# Patient Record
Sex: FEMALE | URBAN - METROPOLITAN AREA
[De-identification: names, ages, dates, MRNs, and addresses within clinical notes are randomized per-mention and may not be internally consistent; named-entity substitution may affect disease eponyms.]

---

## 2024-01-23 ENCOUNTER — APPOINTMENT (RX ONLY)
Dept: URBAN - METROPOLITAN AREA CLINIC 308 | Facility: CLINIC | Age: 34
Setting detail: DERMATOLOGY
End: 2024-01-23

## 2024-01-23 DIAGNOSIS — L72.0 EPIDERMAL CYST: ICD-10-CM

## 2024-01-23 DIAGNOSIS — L24 IRRITANT CONTACT DERMATITIS: ICD-10-CM | Status: INADEQUATELY CONTROLLED

## 2024-01-23 PROBLEM — L24.9 IRRITANT CONTACT DERMATITIS, UNSPECIFIED CAUSE: Status: ACTIVE | Noted: 2024-01-23

## 2024-01-23 PROCEDURE — 81025 URINE PREGNANCY TEST: CPT

## 2024-01-23 PROCEDURE — ? PRESCRIPTION

## 2024-01-23 PROCEDURE — ? INTRALESIONAL KENALOG

## 2024-01-23 PROCEDURE — 99204 OFFICE O/P NEW MOD 45 MIN: CPT | Mod: 25

## 2024-01-23 PROCEDURE — ? COUNSELING

## 2024-01-23 PROCEDURE — ? ADDITIONAL NOTES

## 2024-01-23 PROCEDURE — ? URINE PREGNANCY TEST

## 2024-01-23 PROCEDURE — 11900 INJECT SKIN LESIONS </W 7: CPT

## 2024-01-23 RX ORDER — HYDROCORTISONE 25 MG/G
CREAM TOPICAL
Qty: 28.35 | Refills: 3 | Status: ERX | COMMUNITY
Start: 2024-01-23

## 2024-01-23 RX ORDER — DOXYCYCLINE HYCLATE 100 MG/1
TABLET, COATED ORAL
Qty: 28 | Refills: 0 | Status: ERX | COMMUNITY
Start: 2024-01-23

## 2024-01-23 RX ADMIN — HYDROCORTISONE: 25 CREAM TOPICAL at 00:00

## 2024-01-23 RX ADMIN — DOXYCYCLINE HYCLATE: 100 TABLET, COATED ORAL at 00:00

## 2024-01-23 ASSESSMENT — LOCATION DETAILED DESCRIPTION DERM
LOCATION DETAILED: LEFT ANTERIOR SHOULDER
LOCATION DETAILED: LEFT ANTERIOR AXILLA

## 2024-01-23 ASSESSMENT — LOCATION SIMPLE DESCRIPTION DERM
LOCATION SIMPLE: LEFT SHOULDER
LOCATION SIMPLE: LEFT ANTERIOR AXILLA

## 2024-01-23 ASSESSMENT — LOCATION ZONE DERM
LOCATION ZONE: ARM
LOCATION ZONE: AXILLAE

## 2024-01-23 NOTE — PROCEDURE: INTRALESIONAL KENALOG
How Many Mls Were Removed From The 40 Mg/Ml (5ml) Vial When Preparing The Injectable Solution?: 0
Require Ndc Code?: No
Ndc# For Kenalog Only: 86818-737-01
Lot # For Kenalog (Optional): [id number]
Kenalog Type Of Vial: Single Dose
Validate Note Data When Using Inventory: Yes
Expiration Date For Kenalog (Optional): 12/2024
Detail Level: Detailed
Concentration Of Kenalog Solution Injected (Mg/Ml): 20.0
Consent: The risks of atrophy were reviewed with the patient.  The patient's verbal consent was obtained.
Kenalog Preparation: Kenalog
Total Volume (Ccs): 0.2
Medical Necessity Clause: This procedure was medically necessary because the lesions that were treated were:

## 2024-01-23 NOTE — PROCEDURE: URINE PREGNANCY TEST
Lot # (Optional): [id number]
Urine Pregnancy Test Result: negative
Expiration Date (Optional): 08/25/2024
Performed By: Wilda Sauceda MA
Detail Level: None

## 2024-01-23 NOTE — PROCEDURE: ADDITIONAL NOTES
Detail Level: Simple
Additional Notes: Discussed possible ddx to include HS vs EIC. Patient denies previous episodes making suspicion for EIC higher.  Recommended ILK and doxycycline.  Pregnancy test negative per above.  If recurs, patient was provided our voicemail for excision - recommended this be done with plastics.
Render Risk Assessment In Note?: yes

## 2024-11-13 ENCOUNTER — OFFICE VISIT (OUTPATIENT)
Dept: GASTROENTEROLOGY | Facility: CLINIC | Age: 34
End: 2024-11-13
Payer: COMMERCIAL

## 2024-11-13 VITALS
SYSTOLIC BLOOD PRESSURE: 132 MMHG | TEMPERATURE: 97.4 F | OXYGEN SATURATION: 99 % | DIASTOLIC BLOOD PRESSURE: 91 MMHG | WEIGHT: 248.6 LBS | HEART RATE: 81 BPM | BODY MASS INDEX: 37.68 KG/M2 | HEIGHT: 68 IN

## 2024-11-13 DIAGNOSIS — K75.81 METABOLIC DYSFUNCTION-ASSOCIATED STEATOHEPATITIS (MASH): Primary | ICD-10-CM

## 2024-11-13 DIAGNOSIS — R16.0 HEPATOMEGALY: ICD-10-CM

## 2024-11-13 DIAGNOSIS — R79.89 ELEVATED LFTS: ICD-10-CM

## 2024-11-13 PROCEDURE — 99204 OFFICE O/P NEW MOD 45 MIN: CPT | Performed by: INTERNAL MEDICINE

## 2024-11-13 RX ORDER — LEVOTHYROXINE SODIUM 25 UG/1
25 TABLET ORAL DAILY
COMMUNITY

## 2024-11-13 NOTE — PROGRESS NOTES
[facility] - Outpatient Consultation  Sana Bolanos 34 y.o. female MRN: [id number]  Encounter: [id number]          ASSESSMENT AND PLAN:      1. Elevated LFTs  - Ambulatory Referral to Gastroenterology  - MARKS Fibrosure; Future  - US elastography; Future  - SOBEIDA Screen w/ Reflex to Titer/Pattern; Future  - Anti-smooth muscle antibody, IgG; Future  - IgG; Future  - Ceruloplasmin; Future we had a full  - Iron Panel (Includes Ferritin, Iron Sat%, Iron, and TIBC); Future  - Hepatitis B surface antigen; Future  - Hepatitis C antibody; Future  - Alpha-1-antitrypsin; Future  -I provided a full discussion about steatotic liver disease distinguishing metabolic dysfunction associated steatotic liver disease versus metabolic dysfunction associated steatohepatitis  -I have requested that the most recent labs from the GYN facility in [place] be sent to this clinic as soon as possible    2. Metabolic dysfunction-associated steatohepatitis (MASH) (Primary)  - MARKS Fibrosure; Future  - US elastography; Future  - SOBEIDA Screen w/ Reflex to Titer/Pattern; Future  - Anti-smooth muscle antibody, IgG; Future  - IgG; Future  - Ceruloplasmin; Future  - Iron Panel (Includes Ferritin, Iron Sat%, Iron, and TIBC); Future  - Hepatitis B surface antigen; Future  - Hepatitis C antibody; Future  - Alpha-1-antitrypsin; Future    3. Hepatomegaly  - MARKS Fibrosure; Future  - US elastography; Future  - SOBEIDA Screen w/ Reflex to Titer/Pattern; Future  - Anti-smooth muscle antibody, IgG; Future  - IgG; Future  - Ceruloplasmin; Future  - Iron Panel (Includes Ferritin, Iron Sat%, Iron, and TIBC); Future  - Hepatitis B surface antigen; Future  - Hepatitis C antibody; Future  - Alpha-1-antitrypsin; Future    ______________________________________________________________________    HPI: Sana is a 34-year-old female who recently moved from [place] to [place] and has come to this office for the establishment of care.  She was  "told within the past year that she had a fatty liver and hepatomegaly in association with abnormal liver enzymes.  I do not have any of the laboratories available to me for evaluation at this point in time.  There is no family history for liver disease.  She states that she drinks alcohol very seldom.  Her elder brother, however, was diagnosed with liver cancer in his 30s and is now .  He was not a drinker either.  She denies abdominal pain, nausea, vomiting, rectal bleeding, melena, bloating, gaseousness but she does admit to occasional heartburn and she states she has diarrhea related to a supplement she is currently taking.  She denies constipation.      Historical Information   Past Medical History:   Diagnosis Date    Fatty liver      No past surgical history on file.  Social History   Social History     Substance and Sexual Activity   Alcohol Use Yes    Comment: rarely     Social History     Substance and Sexual Activity   Drug Use Never     Social History     Tobacco Use   Smoking Status Never   Smokeless Tobacco Never     Family History   Problem Relation Age of Onset    No Known Problems Maternal Grandmother     Diabetes type I Maternal Grandfather     Hyperlipidemia Maternal Grandfather        Meds/Allergies       Current Outpatient Medications:     levothyroxine 25 mcg tablet    No Known Allergies        Objective     Blood pressure 132/91, pulse 81, temperature (!) 97.4 °F (36.3 °C), temperature source Temporal, height 5' 8\" (1.727 m), weight 113 kg (248 lb 9.6 oz), SpO2 99%. Body mass index is 37.8 kg/m².        PHYSICAL EXAM:      General Appearance:   Alert, cooperative, no distress   HEENT:   Normocephalic, atraumatic, anicteric.     Neck:  Supple, symmetrical, trachea midline   Lungs:   Clear to auscultation bilaterally; no rales, rhonchi or wheezing; respirations unlabored    Heart::   Regular rate and rhythm; no murmur, rub, or gallop.   Abdomen:   Soft, non-tender, non-distended; normal " bowel sounds; no masses, no organomegaly    Genitalia:   Deferred    Rectal:   Deferred    Extremities:  No cyanosis, clubbing or edema    Pulses:  2+ and symmetric    Skin:  No jaundice, rashes, or lesions    Lymph nodes:  No palpable cervical lymphadenopathy        Lab Results:   No visits with results within 1 Day(s) from this visit.   Latest known visit with results is:   No results found for any previous visit.         Radiology Results:   No results found.

## 2024-11-13 NOTE — LETTER
November 15, 2024     Tara Budinetz, DO [facility] - [address] [place]   [address]  [facility] [address]    Patient: Sana Bolanos   YOB: 1990   Date of Visit: 11/13/2024       Dear Dr. Budinetz:    Thank you for referring Sana Bolanos to me for evaluation. Below are my notes for this consultation.    If you have questions, please do not hesitate to call me. I look forward to following your patient along with you.         Sincerely,        Jeanmarie Stark DO        CC: No Recipients    Jeanmarie Stark DO  11/13/2024  5:13 PM  Signed  [facility] - Outpatient Consultation  Sana Bolanos 34 y.o. female MRN: [id number]  Encounter: [id number]          ASSESSMENT AND PLAN:      1. Elevated LFTs  - Ambulatory Referral to Gastroenterology  - MARKS Fibrosure; Future  - US elastography; Future  - SOBEIDA Screen w/ Reflex to Titer/Pattern; Future  - Anti-smooth muscle antibody, IgG; Future  - IgG; Future  - Ceruloplasmin; Future we had a full  - Iron Panel (Includes Ferritin, Iron Sat%, Iron, and TIBC); Future  - Hepatitis B surface antigen; Future  - Hepatitis C antibody; Future  - Alpha-1-antitrypsin; Future  -I provided a full discussion about steatotic liver disease distinguishing metabolic dysfunction associated steatotic liver disease versus metabolic dysfunction associated steatohepatitis  -I have requested that the most recent labs from the GYN facility in [place] be sent to this clinic as soon as possible    2. Metabolic dysfunction-associated steatohepatitis (MASH) (Primary)  - MARKS Fibrosure; Future  - US elastography; Future  - SOBEIDA Screen w/ Reflex to Titer/Pattern; Future  - Anti-smooth muscle antibody, IgG; Future  - IgG; Future  - Ceruloplasmin; Future  - Iron Panel (Includes Ferritin, Iron Sat%, Iron, and TIBC); Future  - Hepatitis B surface antigen; Future  - Hepatitis C antibody; Future  - Alpha-1-antitrypsin; Future    3.  Hepatomegaly  - MARKS Fibrosure; Future  - US elastography; Future  - SOBEIDA Screen w/ Reflex to Titer/Pattern; Future  - Anti-smooth muscle antibody, IgG; Future  - IgG; Future  - Ceruloplasmin; Future  - Iron Panel (Includes Ferritin, Iron Sat%, Iron, and TIBC); Future  - Hepatitis B surface antigen; Future  - Hepatitis C antibody; Future  - Alpha-1-antitrypsin; Future    ______________________________________________________________________    HPI: Sana is a 34-year-old female who recently moved from [place] to [place] and has come to this office for the establishment of care.  She was told within the past year that she had a fatty liver and hepatomegaly in association with abnormal liver enzymes.  I do not have any of the laboratories available to me for evaluation at this point in time.  There is no family history for liver disease.  She states that she drinks alcohol very seldom.  Her elder brother, however, was diagnosed with liver cancer in his 30s and is now .  He was not a drinker either.  She denies abdominal pain, nausea, vomiting, rectal bleeding, melena, bloating, gaseousness but she does admit to occasional heartburn and she states she has diarrhea related to a supplement she is currently taking.  She denies constipation.      Historical Information  Past Medical History:   Diagnosis Date   • Fatty liver      No past surgical history on file.  Social History  Social History     Substance and Sexual Activity   Alcohol Use Yes    Comment: rarely     Social History     Substance and Sexual Activity   Drug Use Never     Social History     Tobacco Use   Smoking Status Never   Smokeless Tobacco Never     Family History   Problem Relation Age of Onset   • No Known Problems Maternal Grandmother    • Diabetes type I Maternal Grandfather    • Hyperlipidemia Maternal Grandfather        Meds/Allergies      Current Outpatient Medications:   •  levothyroxine 25 mcg tablet    No Known  "Allergies        Objective    Blood pressure 132/91, pulse 81, temperature (!) 97.4 °F (36.3 °C), temperature source Temporal, height 5' 8\" (1.727 m), weight 113 kg (248 lb 9.6 oz), SpO2 99%. Body mass index is 37.8 kg/m².        PHYSICAL EXAM:      General Appearance:   Alert, cooperative, no distress   HEENT:   Normocephalic, atraumatic, anicteric.     Neck:  Supple, symmetrical, trachea midline   Lungs:   Clear to auscultation bilaterally; no rales, rhonchi or wheezing; respirations unlabored    Heart::   Regular rate and rhythm; no murmur, rub, or gallop.   Abdomen:   Soft, non-tender, non-distended; normal bowel sounds; no masses, no organomegaly    Genitalia:   Deferred    Rectal:   Deferred    Extremities:  No cyanosis, clubbing or edema    Pulses:  2+ and symmetric    Skin:  No jaundice, rashes, or lesions    Lymph nodes:  No palpable cervical lymphadenopathy        Lab Results:   No visits with results within 1 Day(s) from this visit.   Latest known visit with results is:   No results found for any previous visit.         Radiology Results:   No results found.  "

## 2024-12-01 ENCOUNTER — TELEPHONE (OUTPATIENT)
Dept: OTHER | Facility: OTHER | Age: 34
End: 2024-12-01

## 2024-12-01 NOTE — TELEPHONE ENCOUNTER
Patient is calling regarding cancelling an appointment.    Date/Time:12/13/2024 9:00    Patient was rescheduled: YES [x] NO []    Patient requesting call back to reschedule: YES [] NO [x]

## 2024-12-02 ENCOUNTER — HOSPITAL ENCOUNTER (OUTPATIENT)
Dept: ULTRASOUND IMAGING | Facility: HOSPITAL | Age: 34
Discharge: HOME | End: 2024-12-02
Attending: INTERNAL MEDICINE
Payer: COMMERCIAL

## 2024-12-02 DIAGNOSIS — K75.81 METABOLIC DYSFUNCTION-ASSOCIATED STEATOHEPATITIS (MASH): ICD-10-CM

## 2024-12-02 DIAGNOSIS — R16.0 HEPATOMEGALY: ICD-10-CM

## 2024-12-02 DIAGNOSIS — R79.89 ELEVATED LFTS: ICD-10-CM

## 2024-12-02 PROCEDURE — 76981 USE PARENCHYMA: CPT

## 2024-12-06 ENCOUNTER — APPOINTMENT (OUTPATIENT)
Dept: LAB | Facility: CLINIC | Age: 34
End: 2024-12-06
Payer: COMMERCIAL

## 2024-12-06 ENCOUNTER — RESULTS FOLLOW-UP (OUTPATIENT)
Dept: GASTROENTEROLOGY | Facility: CLINIC | Age: 34
End: 2024-12-06

## 2024-12-06 DIAGNOSIS — R79.89 ELEVATED LFTS: ICD-10-CM

## 2024-12-06 DIAGNOSIS — R16.0 HEPATOMEGALY: ICD-10-CM

## 2024-12-06 DIAGNOSIS — K75.81 METABOLIC DYSFUNCTION-ASSOCIATED STEATOHEPATITIS (MASH): ICD-10-CM

## 2024-12-06 DIAGNOSIS — E07.9 DISEASE OF THYROID GLAND: ICD-10-CM

## 2024-12-06 LAB
ANA SER QL IA: NEGATIVE
FERRITIN SERPL-MCNC: 106 NG/ML (ref 11–307)
HBV SURFACE AG SER QL: NORMAL
HCV AB SER QL: NORMAL
IGG SERPL-MCNC: 750 MG/DL (ref 635–1741)
IRON SATN MFR SERPL: 24 % (ref 15–50)
IRON SERPL-MCNC: 91 UG/DL (ref 50–212)
TIBC SERPL-MCNC: 372 UG/DL (ref 250–450)
TSH SERPL DL<=0.05 MIU/L-ACNC: 2.54 UIU/ML (ref 0.45–4.5)
UIBC SERPL-MCNC: 281 UG/DL (ref 155–355)

## 2024-12-06 PROCEDURE — 82784 ASSAY IGA/IGD/IGG/IGM EACH: CPT

## 2024-12-06 PROCEDURE — 82728 ASSAY OF FERRITIN: CPT

## 2024-12-06 PROCEDURE — 86038 ANTINUCLEAR ANTIBODIES: CPT

## 2024-12-06 PROCEDURE — 84443 ASSAY THYROID STIM HORMONE: CPT

## 2024-12-06 PROCEDURE — 83550 IRON BINDING TEST: CPT

## 2024-12-06 PROCEDURE — 36415 COLL VENOUS BLD VENIPUNCTURE: CPT

## 2024-12-06 PROCEDURE — 83540 ASSAY OF IRON: CPT

## 2024-12-06 PROCEDURE — 82390 ASSAY OF CERULOPLASMIN: CPT

## 2024-12-06 PROCEDURE — 86803 HEPATITIS C AB TEST: CPT

## 2024-12-06 PROCEDURE — 87340 HEPATITIS B SURFACE AG IA: CPT

## 2024-12-06 PROCEDURE — 86015 ACTIN ANTIBODY EACH: CPT

## 2024-12-07 ENCOUNTER — APPOINTMENT (OUTPATIENT)
Dept: LAB | Facility: CLINIC | Age: 34
End: 2024-12-07
Payer: COMMERCIAL

## 2024-12-07 LAB
ACTIN IGG SERPL-ACNC: 4 UNITS (ref 0–19)
CERULOPLASMIN SERPL-MCNC: 33.4 MG/DL (ref 19–39)

## 2024-12-07 PROCEDURE — 84460 ALANINE AMINO (ALT) (SGPT): CPT

## 2024-12-07 PROCEDURE — 36415 COLL VENOUS BLD VENIPUNCTURE: CPT

## 2024-12-07 PROCEDURE — 82247 BILIRUBIN TOTAL: CPT

## 2024-12-07 PROCEDURE — 82103 ALPHA-1-ANTITRYPSIN TOTAL: CPT

## 2024-12-07 PROCEDURE — 82172 ASSAY OF APOLIPOPROTEIN: CPT

## 2024-12-07 PROCEDURE — 82977 ASSAY OF GGT: CPT

## 2024-12-07 PROCEDURE — 82947 ASSAY GLUCOSE BLOOD QUANT: CPT

## 2024-12-07 PROCEDURE — 83883 ASSAY NEPHELOMETRY NOT SPEC: CPT

## 2024-12-07 PROCEDURE — 82465 ASSAY BLD/SERUM CHOLESTEROL: CPT

## 2024-12-07 PROCEDURE — 84450 TRANSFERASE (AST) (SGOT): CPT

## 2024-12-07 PROCEDURE — 84478 ASSAY OF TRIGLYCERIDES: CPT

## 2024-12-07 PROCEDURE — 83010 ASSAY OF HAPTOGLOBIN QUANT: CPT

## 2024-12-09 LAB — A1AT SERPL-MCNC: 168 MG/DL (ref 100–188)

## 2024-12-09 NOTE — TELEPHONE ENCOUNTER
----- Message from Jeanmarie Stark DO sent at 12/6/2024 10:31 AM EST -----  Please call the patient with the ultrasound with elastography results.  The ultrasound is showing a fatty liver with severe fibrosis.  Because of the severe fibrosis, this patient is a candidate for Rezdiffra.   Her dose would be 100 mg daily.  This is the new medication that is available to treat this condition with the good possibility of improving the fibrosis.    Mei, please prepare the paperwork so I can sign it so we can obtain approval for this medication to be prescribed for this patient.

## 2024-12-09 NOTE — TELEPHONE ENCOUNTER
Called pt LMOM  requesting a call back to review ultrasound with elastography results and Patient assistance Noe. If pt calls please transfer call to my attention. Thanks.

## 2024-12-10 ENCOUNTER — TELEPHONE (OUTPATIENT)
Age: 34
End: 2024-12-10

## 2024-12-10 ENCOUNTER — TELEPHONE (OUTPATIENT)
Dept: GASTROENTEROLOGY | Facility: CLINIC | Age: 34
End: 2024-12-10

## 2024-12-10 ENCOUNTER — NURSE TRIAGE (OUTPATIENT)
Age: 34
End: 2024-12-10

## 2024-12-10 LAB
A2 MACROGLOB SERPL-MCNC: 205 MG/DL (ref 110–276)
ALT SERPL W P-5'-P-CCNC: 95 IU/L (ref 0–40)
APO A-I SERPL-MCNC: 155 MG/DL (ref 116–209)
AST SERPL W P-5'-P-CCNC: 62 IU/L (ref 0–40)
BILIRUB SERPL-MCNC: 0.3 MG/DL (ref 0–1.2)
CHOLEST SERPL-MCNC: 163 MG/DL (ref 100–199)
FIBROSIS SCORING:: ABNORMAL
FIBROSIS STAGE SERPL QL: ABNORMAL
GGT SERPL-CCNC: 55 IU/L (ref 0–60)
GLUCOSE SERPL-MCNC: 113 MG/DL (ref 70–99)
HAPTOGLOB SERPL-MCNC: 193 MG/DL (ref 33–278)
INTERPRETATION: ABNORMAL
LABORATORY COMMENT REPORT: ABNORMAL
LIVER FIBR SCORE SERPL CALC.FIBROSURE: 0.08 (ref 0–0.21)
NASH GRADE SERPL QL: ABNORMAL
NASH SCORE SERPL: 0.59 (ref 0–0.25)
REF LAB TEST METHOD: ABNORMAL
SL AMB NASH SCORING: ABNORMAL
SL AMB STEATOSIS GRADE: ABNORMAL
SL AMB STEATOSIS SCORE: 0.54 (ref 0–0.4)
STEATOSIS SCORING: ABNORMAL
TEST PERFORMANCE INFO SPEC: ABNORMAL
TRIGL SERPL-MCNC: 81 MG/DL (ref 0–149)

## 2024-12-10 NOTE — TELEPHONE ENCOUNTER
"SPOKE WITH PT, HAS QUESTIONS ABOUT REAMAIRANI, PT IS CURRENTLY WORKING WITH A FERTILITY SPECIALIST TRYING TO GET PREGNANT. PT ALSO HAS QUESTIONS ABOUT THE PAPERWORK/PROCESS OBTAINING MEDICATION.           Answer Assessment - Initial Assessment Questions  1. REASON FOR CALL: \"What is the main reason for your call?\" or \"How can I best help you?\"      SPOKE WITH PT, HAS QUESTIONS ABOUT REAMAIRANI, PT IS CURRENTLY WORKING WITH A FERTILITY SPECIALIST TRYING TO GET PREGNANT. PT ALSO HAS QUESTIONS ABOUT THE PAPERWORK/PROCESS OBTAINING MEDICATION.    Protocols used: Information Only Call - No Triage-Adult-[place]    "

## 2024-12-10 NOTE — TELEPHONE ENCOUNTER
Called pt again, LMOM asking for return phone call. If pt call back please transfer call to me. Thanks. Sent pt a [employer] message as well.

## 2024-12-10 NOTE — TELEPHONE ENCOUNTER
Patient needs to know if she needs if the form she needs to sign for her medication can be uploaded to her my chart?  Please phone [phone] to advise

## 2024-12-10 NOTE — TELEPHONE ENCOUNTER
Please add patient to Dr. Stark's urgent appt slot Monday January 6 @ 3:20. There was a mix up on her appt.  Thank you

## 2024-12-10 NOTE — TELEPHONE ENCOUNTER
Patients GI provider:  Dr. Stark     Number to return call: [phone]     Reason for call: Pt calling advising she received the text confirming her appt for Jan 6th but it unable to view it in [employer]. The appt was cancelled on 12/2, the pt did not cancel the appt.  I do not see any notes as to why it was cancelled, please contact the pt back to confirm and verify if she can be added back to that date ( it is no longer open for me to schedule).     Scheduled procedure/appointment date if applicable: n/a

## 2024-12-11 ENCOUNTER — TELEPHONE (OUTPATIENT)
Dept: GASTROENTEROLOGY | Facility: CLINIC | Age: 34
End: 2024-12-11

## 2024-12-11 NOTE — TELEPHONE ENCOUNTER
SPOKE WITH PT, INFORMED OF YOUR RECOMMENDATION AND RATIONALE. PT PLANS TO DISCUSS REZDIFFRA WITH FERTILITY SPECIALIST AS WELL. ARE THERE ANY ALTERNATIVES FOR TREATMENT?  PT IS SCHEDULED FOR F/U WITH YOU 1/6/25. THANK YOU.

## 2024-12-11 NOTE — TELEPHONE ENCOUNTER
----- Message from Jeanmarie Stark DO sent at 12/11/2024  7:56 AM EST -----  Delmis, please call this patient and make her aware that this new medication is very expensive.  We have to go through a process where it is approved and then through a secondary process so that we can obtain the medication.  I cannot call any pharmacy locally to provide this medication.

## 2024-12-11 NOTE — TELEPHONE ENCOUNTER
Please see pts message.  Pt is working closely with fertility specialist Dr. Budinetz to get pregnant. She wants to know can she take this medication when pregnant or working on getting pregnant? Please advise.

## 2024-12-11 NOTE — TELEPHONE ENCOUNTER
Spoke to pt and informed her about the paperwork process, but before we move forward waiting to hear back from Dr. Stark about pts questions..    PT IS CURRENTLY WORKING WITH A FERTILITY SPECIALIST TRYING TO GET PREGNANT.  Is it okay to take Rezdiffra?      Awaiting response from Dr. Stark.

## 2024-12-16 NOTE — TELEPHONE ENCOUNTER
----- Message from Jeanmarie Stark DO sent at 12/16/2024  6:16 AM EST -----  Patient has not read [employer] message. Please call and share results.

## 2024-12-19 NOTE — TELEPHONE ENCOUNTER
LMOM PER CONSENT, INFORMING PT OF PROVIDER RESPONSE, CAN DISCUSS FURTHER AT UPCOMING OFFICE VISIT. PLEASE CALL BACK IF ANY QUESTIONS.

## 2025-01-06 ENCOUNTER — OFFICE VISIT (OUTPATIENT)
Dept: GASTROENTEROLOGY | Facility: CLINIC | Age: 35
End: 2025-01-06
Payer: COMMERCIAL

## 2025-01-06 VITALS
DIASTOLIC BLOOD PRESSURE: 78 MMHG | WEIGHT: 252 LBS | OXYGEN SATURATION: 99 % | HEART RATE: 68 BPM | TEMPERATURE: 97.6 F | SYSTOLIC BLOOD PRESSURE: 120 MMHG | BODY MASS INDEX: 38.19 KG/M2 | HEIGHT: 68 IN

## 2025-01-06 DIAGNOSIS — K75.81 METABOLIC DYSFUNCTION-ASSOCIATED STEATOHEPATITIS (MASH): Primary | ICD-10-CM

## 2025-01-06 DIAGNOSIS — R79.89 ELEVATED LFTS: ICD-10-CM

## 2025-01-06 DIAGNOSIS — R16.0 HEPATOMEGALY: ICD-10-CM

## 2025-01-06 PROCEDURE — 99214 OFFICE O/P EST MOD 30 MIN: CPT | Performed by: INTERNAL MEDICINE

## 2025-01-06 RX ORDER — LEVOTHYROXINE SODIUM 50 UG/1
TABLET ORAL
COMMUNITY
Start: 2025-01-05

## 2025-01-06 RX ORDER — ACETAMINOPHEN AND CODEINE PHOSPHATE 120; 12 MG/5ML; MG/5ML
SOLUTION ORAL
COMMUNITY
Start: 2024-12-26

## 2025-01-06 RX ORDER — LETROZOLE 2.5 MG/1
TABLET, FILM COATED ORAL
COMMUNITY
Start: 2024-12-31

## 2025-01-06 RX ORDER — PROGESTERONE 200 MG/1
CAPSULE ORAL
COMMUNITY
Start: 2024-12-31

## 2025-01-07 NOTE — PROGRESS NOTES
Name: Sana Bolanos      : 1990      MRN: [id number]  Encounter Provider: Jeanmarie Stark DO  Encounter Date: 2025   Encounter department: [facility] [place]  :  Assessment & Plan  Metabolic dysfunction-associated steatohepatitis (MASH)  I will be prescribing Rezdiffra, 100 mg daily, in addition to recommendations for continued weight loss through dietary restriction and exercise  We will fill out the appropriate paperwork to obtain this medication through insurance  The Becerril FibroSure results demonstrate fibrosis score of 0.08 consistent with F2 0 fibrosis and a steatosis score of S1 consistent with minimal steatosis.  Ultrasound with elastography on 2024 demonstrates a mid of your score of F3 consistent with severe fibrosis and a steatosis grading score of S3 consistent with severe steatosis.    Elevated LFTs  Liver panel on 2024 shows AST 62 and ALT 95    Hepatomegaly  Consistent with the patient's [facility]      History of Present Illness Sana Bolanos is a 34 y.o. female who presents back to the office for routine follow-up.  She is currently asymptomatic, denying abdominal pain, nausea, vomiting, diarrhea, constipation, rectal bleeding, hematemesis, melena, heartburn, dysphagia, odynophagia, change in bowel habits, bloating, gaseousness.  She states that both her and her  are actively seeking GYN assistance for an eventual pregnancy.  I had informed the patient's that the medication, Rezdiffra, has an unknown pregnancy profile.  For this reason, this medication cannot be taken if the patient become pregnant.    Her liver workup regarding abnormal liver enzymes shows that she has a normal alpha-1 antitrypsin level.  Her hepatitis C and hepatitis B tests were negative.  She has a normal iron saturation and a normal ceruloplasmin level.  Her SOBEIDA, anti-smooth muscle antibody, and IgG are all normal.  Her Becerril FibroSure suggested S1 steatosis and F1  "fibrosis.  Her ultrasound with elastography, however, demonstrated an S3 steatosis and an F3 fibrosis.  This patient is an ideal candidate for Rezdiffra           Objective /78 (BP Location: Right arm, Patient Position: Sitting, Cuff Size: Standard)   Pulse 68   Temp 97.6 °F (36.4 °C) (Temporal)   Ht 5' 8\" (1.727 m)   Wt 114 kg (252 lb)   SpO2 99%   BMI 38.32 kg/m²      Physical Exam  Vitals reviewed.   Constitutional:       General: She is not in acute distress.     Appearance: Normal appearance. She is obese. She is not ill-appearing.   HENT:      Head: Normocephalic and atraumatic.   Eyes:      General: No scleral icterus.     Extraocular Movements: Extraocular movements intact.      Pupils: Pupils are equal, round, and reactive to light.   Cardiovascular:      Rate and Rhythm: Normal rate and regular rhythm.      Heart sounds: No murmur heard.  Pulmonary:      Effort: Pulmonary effort is normal.      Breath sounds: Normal breath sounds. No wheezing, rhonchi or rales.   Abdominal:      General: Bowel sounds are normal.      Palpations: Abdomen is soft. There is no mass.   Musculoskeletal:         General: Normal range of motion.      Cervical back: Normal range of motion and neck supple.      Right lower leg: No edema.      Left lower leg: No edema.   Skin:     General: Skin is warm and dry.      Coloration: Skin is not jaundiced.      Findings: No rash.   Neurological:      General: No focal deficit present.      Mental Status: She is alert and oriented to person, place, and time.   Psychiatric:         Mood and Affect: Mood normal.         Behavior: Behavior normal.           "

## 2025-01-13 DIAGNOSIS — K75.81 METABOLIC DYSFUNCTION-ASSOCIATED STEATOHEPATITIS (MASH): Primary | ICD-10-CM

## 2025-01-14 NOTE — TELEPHONE ENCOUNTER
Called [facility] Patient Support for status update.They are working on the Benefit investigation and will fax it to us when completed.    Routing Rx Rezdiffra 100 mg daily to provider for approval PRINT only. Thanks.

## 2025-01-15 RX ORDER — RESMETIROM 100 MG/1
100 TABLET, COATED ORAL DAILY
Qty: 30 TABLET | Refills: 3 | Status: SHIPPED | OUTPATIENT
Start: 2025-01-15

## 2025-01-16 ENCOUNTER — TELEPHONE (OUTPATIENT)
Age: 35
End: 2025-01-16

## 2025-01-16 NOTE — TELEPHONE ENCOUNTER
PA for Rezdiffra 100 mg    SUBMITTED to [employer] [facility]    via    [x]YOGITECHpts-Case ID # [id number]:CymaxNormannaPhone:[phone]:[phone]   All office notes, labs and other pertaining documents and studies sent. Clinical questions answered. Awaiting determination from insurance company.     Turnaround time for your insurance to make a decision on your Prior Authorization can take 7-21 business days.

## 2025-01-17 ENCOUNTER — TELEPHONE (OUTPATIENT)
Dept: GASTROENTEROLOGY | Facility: CLINIC | Age: 35
End: 2025-01-17

## 2025-01-20 NOTE — TELEPHONE ENCOUNTER
PA for Rezdiffra   APPROVED     Date(s) approved Authorized from January 17, 2025 to January 17, 2026            Patient advised by          []My [employer] Message  [x]Phone call , pt would like to know where script for Rezdiffra will go.   []LMOM  []L/M to call office as no active Communication consent on file  []Unable to leave detailed message as VM not approved on Communication consent      Approval letter scanned into Media Yes

## 2025-01-20 NOTE — TELEPHONE ENCOUNTER
Spoke to pt and informed her that we have not received the completed benefit investigation form from [employer] yet and we don't know who is pts speciality pharmacy is.  Tried calling [employer] today but the office was close due to the

## 2025-02-17 ENCOUNTER — APPOINTMENT (OUTPATIENT)
Dept: LAB | Facility: CLINIC | Age: 35
End: 2025-02-17
Payer: COMMERCIAL

## 2025-02-17 DIAGNOSIS — E07.9 DISEASE OF THYROID GLAND: ICD-10-CM

## 2025-02-17 LAB — TSH SERPL DL<=0.05 MIU/L-ACNC: 1.75 UIU/ML (ref 0.45–4.5)

## 2025-02-17 PROCEDURE — 84443 ASSAY THYROID STIM HORMONE: CPT

## 2025-02-17 PROCEDURE — 36415 COLL VENOUS BLD VENIPUNCTURE: CPT

## 2025-05-16 ENCOUNTER — TELEMEDICINE (OUTPATIENT)
Dept: GASTROENTEROLOGY | Facility: CLINIC | Age: 35
End: 2025-05-16
Payer: COMMERCIAL

## 2025-05-16 VITALS
WEIGHT: 248 LBS | HEIGHT: 67 IN | BODY MASS INDEX: 38.92 KG/M2 | HEART RATE: 78 BPM | TEMPERATURE: 97.5 F | OXYGEN SATURATION: 97 %

## 2025-05-16 DIAGNOSIS — K75.81 METABOLIC DYSFUNCTION-ASSOCIATED STEATOHEPATITIS (MASH): Primary | ICD-10-CM

## 2025-05-16 PROCEDURE — 98006 SYNCH AUDIO-VIDEO EST MOD 30: CPT | Performed by: INTERNAL MEDICINE

## 2025-05-18 NOTE — PROGRESS NOTES
Virtual Brief Visit  Name: Sana Bolanos      : 1990      MRN: [id number]  Encounter Provider: Jeanmarie Stark DO  Encounter Date: 2025   Encounter department: [facility] [place]  :  Assessment & Plan  1.  Steatohepatitis/MASH  Discontinue Rezdiffra due to pregnancy  Resume Rezdiffra following delivery of the baby in 9 months  The Becerril FibroSure demonstrates F2 fibrosis in addition to steatosis.  Ultrasound with elastography on 2024 demonstrates F3 fibrosis and S3 steatosis.  Patient had been started on Rezdiffra, 100 mg daily    2.  Intrauterine pregnancy at 3 weeks gestation        History of Present Illness   History of Present Illness    Sana is a 34-year-old female whose previous weight was 112 kg and was diagnosed with MASH based on the Becerril FibroSure F2 fibrosis and ultrasound with elastography on 2024 demonstrating F3 fibrosis.  She was started on 100 mg of Rezdiffra which she had been taking faithfully until she found out that she was pregnant.  She is currently at 3 weeks gestation.  She states that she contacted [facility] had a discussion regarding the safety of this drug.  The recommendation based on the fact that no research has ever been done on a pregnant woman, that the Rezdiffra should be discontinued.  The plan will be to resume therapy once the baby is delivered.  Up until this point in time, she was tolerating the drug without any difficulty.    Administrative Statements   Encounter provider Jeanmarie Stark DO    The Patient is located at Home and in the following state in which I hold an active license [place].    The patient was identified by name and date of birth. Sana Bolanos was informed that this is a telemedicine visit and that the visit is being conducted through the Epic Embedded platform. She agrees to proceed..  My office door was closed. No one else was in the room.  She acknowledged consent and understanding of privacy  and security of the video platform. The patient has agreed to participate and understands they can discontinue the visit at any time.    I have spent a total time of 20 minutes in caring for this patient on the day of the visit/encounter including , not including the time spent for establishing the audio/video connection.

## 2025-05-19 ENCOUNTER — TELEPHONE (OUTPATIENT)
Dept: GASTROENTEROLOGY | Facility: CLINIC | Age: 35
End: 2025-05-19

## 2025-05-19 NOTE — LETTER
May 19, 2025       Patient: Sana Bolanos   YOB: 1990   Date of Visit: 5/19/2025       Dear Dr. Sana Bolanos:    Thank you for referring Sana Bolanos to me for evaluation. Below are my notes for this consultation.    If you have questions, please do not hesitate to call me. I look forward to following your patient along with you.         Sincerely,        Brittani Colindres MA        CC: No Recipients

## 2025-05-20 ENCOUNTER — TELEPHONE (OUTPATIENT)
Dept: GASTROENTEROLOGY | Facility: CLINIC | Age: 35
End: 2025-05-20

## 2025-05-20 NOTE — LETTER
May 20, 2025       Patient: Sana Bolanos   YOB: 1990   Date of Visit: 5/16/2025       To Whom it May Concern:    Sana Bolanos is under my professional care. Please excused her from work on 5/16/25. If you have any questions or concerns, please don't hesitate to call.    Sincerely,            Jeanmarie Stark, DO